# Patient Record
Sex: MALE | Race: OTHER | Employment: FULL TIME | URBAN - METROPOLITAN AREA
[De-identification: names, ages, dates, MRNs, and addresses within clinical notes are randomized per-mention and may not be internally consistent; named-entity substitution may affect disease eponyms.]

---

## 2017-02-20 ENCOUNTER — TELEPHONE (OUTPATIENT)
Dept: FAMILY MEDICINE CLINIC | Facility: CLINIC | Age: 42
End: 2017-02-20

## 2017-02-20 DIAGNOSIS — Z77.21 EXPOSURE TO BLOOD: Primary | ICD-10-CM

## 2017-03-06 ENCOUNTER — APPOINTMENT (OUTPATIENT)
Dept: LAB | Age: 42
End: 2017-03-06
Attending: INTERNAL MEDICINE
Payer: COMMERCIAL

## 2017-03-06 DIAGNOSIS — Z77.21 EXPOSURE TO BLOOD: ICD-10-CM

## 2017-03-06 LAB
HBV SURFACE AB SER QL: REACTIVE
HBV SURFACE AB SERPL IA-ACNC: 10.72 MIU/ML
HBV SURFACE AG SERPL QL IA: NONREACTIVE
HEPATITIS C VIRUS AB INTERPRETATION: NONREACTIVE

## 2017-03-06 PROCEDURE — 87389 HIV-1 AG W/HIV-1&-2 AB AG IA: CPT

## 2017-03-06 PROCEDURE — 36415 COLL VENOUS BLD VENIPUNCTURE: CPT

## 2017-03-06 PROCEDURE — 87340 HEPATITIS B SURFACE AG IA: CPT

## 2017-03-06 PROCEDURE — 86803 HEPATITIS C AB TEST: CPT

## 2017-03-06 PROCEDURE — 86706 HEP B SURFACE ANTIBODY: CPT

## 2017-03-06 PROCEDURE — 86480 TB TEST CELL IMMUN MEASURE: CPT

## 2017-03-08 LAB
M TB TUBERC IFN-G BLD QL: NEGATIVE
M TB TUBERC IFN-G/MITOGEN IGNF BLD: 0 IU/ML
M TB TUBERC IGNF/MITOGEN IGNF CONTROL: 43.6 IU/ML
MITOGEN IGNF BCKGRD COR BLD-ACNC: 0.02 IU/ML

## 2017-04-14 RX ORDER — ERGOCALCIFEROL 1.25 MG/1
CAPSULE ORAL
Qty: 24 CAPSULE | Refills: 0 | OUTPATIENT
Start: 2017-04-14

## 2017-04-14 NOTE — TELEPHONE ENCOUNTER
Requesting Vitamin D  LOV: 7/11/14  RTC: not noted  Last Labs: 2012  Filled: 4/28/16 #24 with 3 refills    No future appointments.     Last office visit 2014 - med denied with note to f/u

## 2017-04-17 ENCOUNTER — TELEPHONE (OUTPATIENT)
Dept: FAMILY MEDICINE CLINIC | Facility: CLINIC | Age: 42
End: 2017-04-17

## 2017-04-17 RX ORDER — ALPRAZOLAM 0.25 MG/1
0.25 TABLET ORAL 3 TIMES DAILY PRN
Qty: 60 TABLET | Refills: 0 | Status: SHIPPED | COMMUNITY
Start: 2017-04-17 | End: 2019-01-23

## 2017-04-24 ENCOUNTER — TELEPHONE (OUTPATIENT)
Dept: FAMILY MEDICINE CLINIC | Facility: CLINIC | Age: 42
End: 2017-04-24

## 2017-04-24 RX ORDER — ERGOCALCIFEROL 1.25 MG/1
50000 CAPSULE ORAL
Qty: 104 CAPSULE | Refills: 0 | Status: SHIPPED | OUTPATIENT
Start: 2017-04-24 | End: 2021-09-08

## 2017-05-04 RX ORDER — ROSUVASTATIN CALCIUM 20 MG/1
TABLET, COATED ORAL
Qty: 90 TABLET | Refills: 4 | Status: SHIPPED | OUTPATIENT
Start: 2017-05-04 | End: 2018-07-14

## 2017-06-19 RX ORDER — IPRATROPIUM BROMIDE 21 UG/1
2 SPRAY, METERED NASAL EVERY 12 HOURS
Qty: 3 BOTTLE | Refills: 3 | Status: SHIPPED | OUTPATIENT
Start: 2017-06-19

## 2017-06-19 RX ORDER — AZELASTINE HYDROCHLORIDE, FLUTICASONE PROPIONATE 137; 50 UG/1; UG/1
1 SPRAY, METERED NASAL 2 TIMES DAILY
Qty: 3 BOTTLE | Refills: 3 | Status: SHIPPED | OUTPATIENT
Start: 2017-06-19

## 2017-07-08 RX ORDER — IMIPRAMINE HCL 50 MG
TABLET ORAL
Qty: 90 TABLET | Refills: 0 | Status: CANCELLED | OUTPATIENT
Start: 2017-07-08

## 2017-07-10 RX ORDER — IMIPRAMINE HCL 50 MG
TABLET ORAL
Qty: 90 TABLET | Refills: 3 | Status: SHIPPED | OUTPATIENT
Start: 2017-07-10 | End: 2019-08-02

## 2017-12-15 RX ORDER — METOPROLOL SUCCINATE 25 MG/1
TABLET, EXTENDED RELEASE ORAL
Qty: 90 TABLET | Refills: 0 | OUTPATIENT
Start: 2017-12-15

## 2017-12-15 NOTE — TELEPHONE ENCOUNTER
Hypertension Medications Protocol [id number]/15 1:15 PM   CMP or BMP in past 12 months    Appointment in past 6 or next 3 months     Requesting Metoprolol   LOV: 7/11/14  RTC: none   Last Relevant Labs: 10/8/16  Filled: 12/22/16 #90 with 3 refills    No futu

## 2018-03-13 RX ORDER — METOPROLOL SUCCINATE 25 MG/1
TABLET, EXTENDED RELEASE ORAL
Qty: 90 TABLET | Refills: 0 | OUTPATIENT
Start: 2018-03-13

## 2018-03-27 RX ORDER — METOPROLOL SUCCINATE 25 MG/1
TABLET, EXTENDED RELEASE ORAL
Qty: 90 TABLET | Refills: 0 | OUTPATIENT
Start: 2018-03-27

## 2018-07-03 RX ORDER — ROSUVASTATIN CALCIUM 20 MG/1
TABLET, COATED ORAL
Qty: 90 TABLET | Refills: 0 | OUTPATIENT
Start: 2018-07-03

## 2018-08-14 RX ORDER — IMIPRAMINE HCL 50 MG
TABLET ORAL
Qty: 90 TABLET | Refills: 0 | OUTPATIENT
Start: 2018-08-14

## 2018-11-12 PROCEDURE — 86141 C-REACTIVE PROTEIN HS: CPT | Performed by: INTERNAL MEDICINE

## 2018-11-12 PROCEDURE — 82172 ASSAY OF APOLIPOPROTEIN: CPT | Performed by: INTERNAL MEDICINE

## 2018-11-12 PROCEDURE — 83090 ASSAY OF HOMOCYSTEINE: CPT | Performed by: INTERNAL MEDICINE

## 2019-08-19 PROCEDURE — 83655 ASSAY OF LEAD: CPT | Performed by: INTERNAL MEDICINE

## 2019-08-19 PROCEDURE — 82300 ASSAY OF CADMIUM: CPT | Performed by: INTERNAL MEDICINE

## 2019-08-19 PROCEDURE — 82172 ASSAY OF APOLIPOPROTEIN: CPT | Performed by: INTERNAL MEDICINE

## 2019-08-19 PROCEDURE — 83825 ASSAY OF MERCURY: CPT | Performed by: INTERNAL MEDICINE

## 2019-08-19 PROCEDURE — 86141 C-REACTIVE PROTEIN HS: CPT | Performed by: INTERNAL MEDICINE

## 2019-08-19 PROCEDURE — 82175 ASSAY OF ARSENIC: CPT | Performed by: INTERNAL MEDICINE

## 2019-08-19 PROCEDURE — 84630 ASSAY OF ZINC: CPT | Performed by: INTERNAL MEDICINE

## 2021-08-09 ENCOUNTER — HOSPITAL ENCOUNTER (EMERGENCY)
Facility: HOSPITAL | Age: 46
Discharge: LEFT BEFORE DISCHARGE | End: 2021-08-09
Payer: COMMERCIAL

## 2021-08-09 VITALS
RESPIRATION RATE: 20 BRPM | SYSTOLIC BLOOD PRESSURE: 163 MMHG | DIASTOLIC BLOOD PRESSURE: 97 MMHG | BODY MASS INDEX: 34 KG/M2 | OXYGEN SATURATION: 97 % | TEMPERATURE: 100 F | HEART RATE: 110 BPM | WEIGHT: 225 LBS

## 2021-08-10 NOTE — ED INITIAL ASSESSMENT (HPI)
46YM c/c of fever Pt state that he been having fever, headache and cough since being in conference in [place]